# Patient Record
(demographics unavailable — no encounter records)

---

## 2025-01-19 NOTE — ASSESSMENT
[FreeTextEntry1] : 60 y/o female w/ PMHx of hypothyroid, HLD, GERD, fibromyalgia, anxiety, presents for CV follow up - palpitations  #Palpitations - ST (>24%) - no other arrhythmias noted --recommended beta blocker both for palps and anxiety; however wants to try trintellix first --Holter wnl - low burden VPCs, rare AT (not symptomatic) --repeat TTE WNL --Labs: thyroid panel, CBC, CMP --Discussed trigger avoidance --Maintain good hydration --Smoking cessation strongly encouraged  #Hyperlipidemia --recent labs: ; HDL 93 --Increase exercise as tolerated --Mediterranean diet  Follow up in 6 months

## 2025-01-19 NOTE — REASON FOR VISIT
[Hyperlipidemia] : hyperlipidemia [FreeTextEntry1] : follow up - palpitations [FreeTextEntry3] : Clementine Sanchez

## 2025-01-19 NOTE — HISTORY OF PRESENT ILLNESS
[FreeTextEntry1] : 61 y/o female w/ PMHx of hypothyroid, HLD, GERD, fibromyalgia, anxiety, palpitations presents for CV follow up Was admitted at [facility] around dec 15th, 2024 after she experienced numbness and nausea following increase in effexor dose and a botox injection. The numbness progressed to involve her left shoulder/chest which improved with time. Subsequently she felt her heart racing; was admitted for a stroke workup.  Her effexor was eventually stopped; and she is considering trintellix ([employer]). She denies cp, sob, batista, lightheadedness, orthopnea, pnd, or syncope at this time. Suffers from fibromyalgia and chronic pain in the back and neck  Cardiac Workup: EKG today NSR, shortened OR interval, consistent w/ prior Holter 11/2018: predominate NSR, isolated SV ectopic beats, isolated ventricular ectopic beats EXSE 5/7/2020: nl lv sys fxn; nl mayes fxn; 9:50 min Emmanuel; no ischemia; chest tightness in recovery CTA 5/2020: no CAD EXSE 9/2021: nl lv sys fxn; nl mayes fxn; 9:50 min Emmanuel; no ischemia Carotid Duplex 9/2021: min disease b/l Holter 2024: AT 5 episodes (longest 13 beats); <0.1% PVCs, symptomatic episodes correlate with NSR, APCs and ST; ST>24% TTE 2024: normal LV size/function. LVEF 56%, normal RV size/function, normal aorta size. No sig valvular disease.